# Patient Record
Sex: FEMALE | Race: WHITE | NOT HISPANIC OR LATINO | Employment: UNEMPLOYED | URBAN - METROPOLITAN AREA
[De-identification: names, ages, dates, MRNs, and addresses within clinical notes are randomized per-mention and may not be internally consistent; named-entity substitution may affect disease eponyms.]

---

## 2019-11-14 ENCOUNTER — HOSPITAL ENCOUNTER (OUTPATIENT)
Facility: HOSPITAL | Age: 39
Discharge: HOME | End: 2019-11-15
Attending: EMERGENCY MEDICINE | Admitting: INTERNAL MEDICINE
Payer: MEDICAID

## 2019-11-14 DIAGNOSIS — R41.82 ALTERED MENTAL STATE: Primary | ICD-10-CM

## 2019-11-14 DIAGNOSIS — R41.82 ALTERED MENTAL STATUS: ICD-10-CM

## 2019-11-14 PROBLEM — Z72.0 TOBACCO ABUSE: Status: ACTIVE | Noted: 2019-11-14

## 2019-11-14 LAB
ALBUMIN SERPL BCP-MCNC: 4.1 G/DL (ref 3.5–5.2)
ALP SERPL-CCNC: 92 U/L (ref 55–135)
ALT SERPL W/O P-5'-P-CCNC: 115 U/L (ref 10–44)
AMMONIA PLAS-SCNC: 32 UMOL/L (ref 10–50)
AMPHET+METHAMPHET UR QL: NEGATIVE
ANION GAP SERPL CALC-SCNC: 6 MMOL/L (ref 8–16)
APAP SERPL-MCNC: <3 UG/ML (ref 10–20)
APTT BLDCRRT: 25.7 SEC (ref 21–32)
AST SERPL-CCNC: 41 U/L (ref 10–40)
B-HCG UR QL: NEGATIVE
BARBITURATES UR QL SCN>200 NG/ML: NEGATIVE
BASOPHILS # BLD AUTO: 0.06 K/UL (ref 0–0.2)
BASOPHILS NFR BLD: 0.6 % (ref 0–1.9)
BENZODIAZ UR QL SCN>200 NG/ML: NORMAL
BILIRUB SERPL-MCNC: 0.3 MG/DL (ref 0.1–1)
BILIRUB UR QL STRIP: NEGATIVE
BNP SERPL-MCNC: 85 PG/ML (ref 0–99)
BUN SERPL-MCNC: 8 MG/DL (ref 6–20)
BZE UR QL SCN: NEGATIVE
CALCIUM SERPL-MCNC: 10.1 MG/DL (ref 8.7–10.5)
CANNABINOIDS UR QL SCN: NEGATIVE
CHLORIDE SERPL-SCNC: 106 MMOL/L (ref 95–110)
CLARITY UR: CLEAR
CO2 SERPL-SCNC: 30 MMOL/L (ref 23–29)
COLOR UR: YELLOW
CREAT SERPL-MCNC: 0.8 MG/DL (ref 0.5–1.4)
CREAT UR-MCNC: 110.7 MG/DL (ref 15–325)
CTP QC/QA: YES
DIFFERENTIAL METHOD: ABNORMAL
EOSINOPHIL # BLD AUTO: 0.1 K/UL (ref 0–0.5)
EOSINOPHIL NFR BLD: 1.1 % (ref 0–8)
ERYTHROCYTE [DISTWIDTH] IN BLOOD BY AUTOMATED COUNT: 13.4 % (ref 11.5–14.5)
EST. GFR  (AFRICAN AMERICAN): >60 ML/MIN/1.73 M^2
EST. GFR  (NON AFRICAN AMERICAN): >60 ML/MIN/1.73 M^2
ETHANOL SERPL-MCNC: <10 MG/DL
GLUCOSE SERPL-MCNC: 121 MG/DL (ref 70–110)
GLUCOSE UR QL STRIP: NEGATIVE
HCT VFR BLD AUTO: 45.5 % (ref 37–48.5)
HGB BLD-MCNC: 14.6 G/DL (ref 12–16)
HGB UR QL STRIP: NEGATIVE
IMM GRANULOCYTES # BLD AUTO: 0.06 K/UL (ref 0–0.04)
IMM GRANULOCYTES NFR BLD AUTO: 0.6 % (ref 0–0.5)
INR PPP: 1 (ref 0.8–1.2)
KETONES UR QL STRIP: NEGATIVE
LACTATE SERPL-SCNC: 1 MMOL/L (ref 0.5–2.2)
LACTATE SERPL-SCNC: 1.6 MMOL/L (ref 0.5–2.2)
LEUKOCYTE ESTERASE UR QL STRIP: ABNORMAL
LYMPHOCYTES # BLD AUTO: 2.5 K/UL (ref 1–4.8)
LYMPHOCYTES NFR BLD: 24.5 % (ref 18–48)
MAGNESIUM SERPL-MCNC: 2.2 MG/DL (ref 1.6–2.6)
MCH RBC QN AUTO: 29.3 PG (ref 27–31)
MCHC RBC AUTO-ENTMCNC: 32.1 G/DL (ref 32–36)
MCV RBC AUTO: 91 FL (ref 82–98)
METHADONE UR QL SCN>300 NG/ML: NEGATIVE
MICROSCOPIC COMMENT: NORMAL
MONOCYTES # BLD AUTO: 0.6 K/UL (ref 0.3–1)
MONOCYTES NFR BLD: 5.9 % (ref 4–15)
NEUTROPHILS # BLD AUTO: 6.9 K/UL (ref 1.8–7.7)
NEUTROPHILS NFR BLD: 67.3 % (ref 38–73)
NITRITE UR QL STRIP: NEGATIVE
NRBC BLD-RTO: 0 /100 WBC
OPIATES UR QL SCN: NEGATIVE
PCP UR QL SCN>25 NG/ML: NEGATIVE
PH UR STRIP: 7 [PH] (ref 5–8)
PLATELET # BLD AUTO: 282 K/UL (ref 150–350)
PMV BLD AUTO: 10.2 FL (ref 9.2–12.9)
POCT GLUCOSE: 114 MG/DL (ref 70–110)
POTASSIUM SERPL-SCNC: 3.9 MMOL/L (ref 3.5–5.1)
PROT SERPL-MCNC: 7.3 G/DL (ref 6–8.4)
PROT UR QL STRIP: NEGATIVE
PROTHROMBIN TIME: 10 SEC (ref 9–12.5)
RBC # BLD AUTO: 4.99 M/UL (ref 4–5.4)
SALICYLATES SERPL-MCNC: <5 MG/DL (ref 15–30)
SODIUM SERPL-SCNC: 142 MMOL/L (ref 136–145)
SP GR UR STRIP: 1.02 (ref 1–1.03)
T4 FREE SERPL-MCNC: 0.69 NG/DL (ref 0.71–1.51)
TOXICOLOGY INFORMATION: NORMAL
TROPONIN I SERPL DL<=0.01 NG/ML-MCNC: 0.01 NG/ML (ref 0–0.03)
TSH SERPL DL<=0.005 MIU/L-ACNC: 0.38 UIU/ML (ref 0.4–4)
URN SPEC COLLECT METH UR: ABNORMAL
UROBILINOGEN UR STRIP-ACNC: NEGATIVE EU/DL
WBC # BLD AUTO: 10.3 K/UL (ref 3.9–12.7)
WBC #/AREA URNS HPF: 1 /HPF (ref 0–5)

## 2019-11-14 PROCEDURE — 83735 ASSAY OF MAGNESIUM: CPT

## 2019-11-14 PROCEDURE — 96367 TX/PROPH/DG ADDL SEQ IV INF: CPT | Mod: 59

## 2019-11-14 PROCEDURE — 51702 INSERT TEMP BLADDER CATH: CPT | Mod: 59

## 2019-11-14 PROCEDURE — 80307 DRUG TEST PRSMV CHEM ANLYZR: CPT

## 2019-11-14 PROCEDURE — 85610 PROTHROMBIN TIME: CPT

## 2019-11-14 PROCEDURE — 81025 URINE PREGNANCY TEST: CPT | Performed by: EMERGENCY MEDICINE

## 2019-11-14 PROCEDURE — 99291 CRITICAL CARE FIRST HOUR: CPT | Mod: 25

## 2019-11-14 PROCEDURE — 82962 GLUCOSE BLOOD TEST: CPT

## 2019-11-14 PROCEDURE — G0378 HOSPITAL OBSERVATION PER HR: HCPCS

## 2019-11-14 PROCEDURE — 80320 DRUG SCREEN QUANTALCOHOLS: CPT

## 2019-11-14 PROCEDURE — 84484 ASSAY OF TROPONIN QUANT: CPT

## 2019-11-14 PROCEDURE — 81000 URINALYSIS NONAUTO W/SCOPE: CPT | Mod: 59

## 2019-11-14 PROCEDURE — 31500 INSERT EMERGENCY AIRWAY: CPT

## 2019-11-14 PROCEDURE — 63600175 PHARM REV CODE 636 W HCPCS: Performed by: EMERGENCY MEDICINE

## 2019-11-14 PROCEDURE — 83605 ASSAY OF LACTIC ACID: CPT | Mod: 91

## 2019-11-14 PROCEDURE — 96375 TX/PRO/DX INJ NEW DRUG ADDON: CPT | Mod: 59

## 2019-11-14 PROCEDURE — 83880 ASSAY OF NATRIURETIC PEPTIDE: CPT

## 2019-11-14 PROCEDURE — 85730 THROMBOPLASTIN TIME PARTIAL: CPT

## 2019-11-14 PROCEDURE — 84439 ASSAY OF FREE THYROXINE: CPT

## 2019-11-14 PROCEDURE — 82140 ASSAY OF AMMONIA: CPT

## 2019-11-14 PROCEDURE — 63600175 PHARM REV CODE 636 W HCPCS

## 2019-11-14 PROCEDURE — 99900035 HC TECH TIME PER 15 MIN (STAT)

## 2019-11-14 PROCEDURE — 94002 VENT MGMT INPAT INIT DAY: CPT

## 2019-11-14 PROCEDURE — 96365 THER/PROPH/DIAG IV INF INIT: CPT | Mod: 59

## 2019-11-14 PROCEDURE — 80329 ANALGESICS NON-OPIOID 1 OR 2: CPT

## 2019-11-14 PROCEDURE — 80053 COMPREHEN METABOLIC PANEL: CPT

## 2019-11-14 PROCEDURE — 84443 ASSAY THYROID STIM HORMONE: CPT

## 2019-11-14 PROCEDURE — 25000003 PHARM REV CODE 250: Performed by: PHYSICIAN ASSISTANT

## 2019-11-14 PROCEDURE — 83605 ASSAY OF LACTIC ACID: CPT

## 2019-11-14 PROCEDURE — 85025 COMPLETE CBC W/AUTO DIFF WBC: CPT

## 2019-11-14 PROCEDURE — 25000003 PHARM REV CODE 250: Performed by: EMERGENCY MEDICINE

## 2019-11-14 RX ORDER — ACETAMINOPHEN 500 MG
500 TABLET ORAL EVERY 6 HOURS PRN
Status: DISCONTINUED | OUTPATIENT
Start: 2019-11-14 | End: 2019-11-15 | Stop reason: HOSPADM

## 2019-11-14 RX ORDER — MIDAZOLAM HYDROCHLORIDE 1 MG/ML
INJECTION INTRAMUSCULAR; INTRAVENOUS
Status: COMPLETED
Start: 2019-11-14 | End: 2019-11-14

## 2019-11-14 RX ORDER — ETOMIDATE 2 MG/ML
15 INJECTION INTRAVENOUS
Status: COMPLETED | OUTPATIENT
Start: 2019-11-14 | End: 2019-11-14

## 2019-11-14 RX ORDER — PROPOFOL 10 MG/ML
20 INJECTION, EMULSION INTRAVENOUS
Status: COMPLETED | OUTPATIENT
Start: 2019-11-14 | End: 2019-11-14

## 2019-11-14 RX ORDER — FENTANYL CITRATE-0.9 % NACL/PF 10 MCG/ML
25 PLASTIC BAG, INJECTION (ML) INTRAVENOUS CONTINUOUS
Status: DISCONTINUED | OUTPATIENT
Start: 2019-11-14 | End: 2019-11-14

## 2019-11-14 RX ORDER — MIDAZOLAM HYDROCHLORIDE 1 MG/ML
5 INJECTION INTRAMUSCULAR; INTRAVENOUS
Status: DISCONTINUED | OUTPATIENT
Start: 2019-11-14 | End: 2019-11-14

## 2019-11-14 RX ORDER — MIDAZOLAM HYDROCHLORIDE 1 MG/ML
2 INJECTION INTRAMUSCULAR; INTRAVENOUS
Status: COMPLETED | OUTPATIENT
Start: 2019-11-14 | End: 2019-11-14

## 2019-11-14 RX ORDER — ONDANSETRON 2 MG/ML
4 INJECTION INTRAMUSCULAR; INTRAVENOUS EVERY 8 HOURS PRN
Status: DISCONTINUED | OUTPATIENT
Start: 2019-11-14 | End: 2019-11-15 | Stop reason: HOSPADM

## 2019-11-14 RX ORDER — BUPROPION HYDROCHLORIDE 150 MG/1
150 TABLET ORAL DAILY
Status: ON HOLD | COMMUNITY
End: 2019-11-15 | Stop reason: HOSPADM

## 2019-11-14 RX ORDER — HYDROMORPHONE HYDROCHLORIDE 2 MG/ML
1 INJECTION, SOLUTION INTRAMUSCULAR; INTRAVENOUS; SUBCUTANEOUS
Status: COMPLETED | OUTPATIENT
Start: 2019-11-14 | End: 2019-11-14

## 2019-11-14 RX ORDER — ETOMIDATE 2 MG/ML
INJECTION INTRAVENOUS
Status: DISPENSED
Start: 2019-11-14 | End: 2019-11-15

## 2019-11-14 RX ORDER — ROCURONIUM BROMIDE 10 MG/ML
INJECTION, SOLUTION INTRAVENOUS
Status: DISPENSED
Start: 2019-11-14 | End: 2019-11-15

## 2019-11-14 RX ORDER — MIDAZOLAM HYDROCHLORIDE 1 MG/ML
INJECTION INTRAMUSCULAR; INTRAVENOUS
Status: DISCONTINUED
Start: 2019-11-14 | End: 2019-11-14 | Stop reason: WASHOUT

## 2019-11-14 RX ORDER — SODIUM CHLORIDE 0.9 % (FLUSH) 0.9 %
10 SYRINGE (ML) INJECTION
Status: DISCONTINUED | OUTPATIENT
Start: 2019-11-14 | End: 2019-11-15 | Stop reason: HOSPADM

## 2019-11-14 RX ORDER — TRAZODONE HYDROCHLORIDE 100 MG/1
100 TABLET ORAL NIGHTLY
Status: ON HOLD | COMMUNITY
End: 2019-11-15 | Stop reason: HOSPADM

## 2019-11-14 RX ORDER — ROCURONIUM BROMIDE 10 MG/ML
50 INJECTION, SOLUTION INTRAVENOUS
Status: COMPLETED | OUTPATIENT
Start: 2019-11-14 | End: 2019-11-14

## 2019-11-14 RX ORDER — PROPOFOL 10 MG/ML
20 INJECTION, EMULSION INTRAVENOUS CONTINUOUS
Status: DISCONTINUED | OUTPATIENT
Start: 2019-11-14 | End: 2019-11-14

## 2019-11-14 RX ORDER — MULTIVITAMIN
1 TABLET ORAL DAILY
COMMUNITY

## 2019-11-14 RX ORDER — ROCURONIUM BROMIDE 10 MG/ML
INJECTION, SOLUTION INTRAVENOUS
Status: DISCONTINUED
Start: 2019-11-14 | End: 2019-11-14 | Stop reason: WASHOUT

## 2019-11-14 RX ADMIN — ETOMIDATE 15 MG: 2 INJECTION, SOLUTION INTRAVENOUS at 05:11

## 2019-11-14 RX ADMIN — MIDAZOLAM HYDROCHLORIDE 2 MG: 1 INJECTION, SOLUTION INTRAMUSCULAR; INTRAVENOUS at 06:11

## 2019-11-14 RX ADMIN — Medication 25 MCG/HR: at 06:11

## 2019-11-14 RX ADMIN — ACETAMINOPHEN 500 MG: 500 TABLET, FILM COATED ORAL at 10:11

## 2019-11-14 RX ADMIN — MIDAZOLAM HYDROCHLORIDE 2 MG: 1 INJECTION INTRAMUSCULAR; INTRAVENOUS at 06:11

## 2019-11-14 RX ADMIN — PROPOFOL 5 MCG/KG/MIN: 10 INJECTION, EMULSION INTRAVENOUS at 05:11

## 2019-11-14 RX ADMIN — ROCURONIUM BROMIDE 50 MG: 10 INJECTION, SOLUTION INTRAVENOUS at 05:11

## 2019-11-14 RX ADMIN — PROPOFOL 20 MCG/KG/MIN: 10 INJECTION, EMULSION INTRAVENOUS at 05:11

## 2019-11-14 RX ADMIN — HYDROMORPHONE HYDROCHLORIDE 1 MG: 2 INJECTION, SOLUTION INTRAMUSCULAR; INTRAVENOUS; SUBCUTANEOUS at 05:11

## 2019-11-14 NOTE — ED NOTES
Pt carried in by son unresponsive. Son state that they were in [employer], pt state they may have to call EMS, pt then started shaking and collapsed. Pt recently was discharged from a rehab facility. Upon arrival pt not maintaining airway. Narcan was unsuccessful with no response.

## 2019-11-14 NOTE — PROGRESS NOTES
Pt intubated and placed on Servo I vent with settings as followed: PRVC 12/400/+5 and 30%.  Size 7.5 ETT in place and secure at 25 cm at the lip.  Ambu bag and mask at bedside and all alarms on and functioning. NARN. RT will continue to monitor pt status.

## 2019-11-14 NOTE — ED PROVIDER NOTES
"Encounter Date: 2019    SCRIBE #1 NOTE: I, Rohit Flores, am scribing for, and in the presence of,  Antwon Pennington MD. I have scribed the following portions of the note - Other sections scribed: HPI, ROS.       History     Chief Complaint   Patient presents with    Altered Mental Status     Arrives to ER with personal transport through ambulance entrance being carried by son. Son reports pt. was in rehab has a hx of meth addiction. Pt. appears to be altered and confused, is attempting to follow commands.      44 y.o F with a hx of Methamphetamine abuse presents to the ED via personal vehicle for emergent evaluation of AMS. The pt was carried into the ED by her son. The pt's son states "she was complaining of a headache and then she started to shake." The pt repeatedly states "my head hurts." The pt's son notes that she recently returned home from rehab for methamphetamine abuse. The pt denies any drug recent drug use.     The history is provided by a relative and the patient.     Review of patient's allergies indicates:  No Known Allergies  Past Medical History:   Diagnosis Date    Arthritis     rheumatoid arthritis    Asthma     Hypertension     "since i been trying to get ofrf suboxone"    Seizures      Past Surgical History:   Procedure Laterality Date    ABDOMINAL SURGERY  1999    3 c- sections     SECTION N/A      Family History   Problem Relation Age of Onset    Diabetes Mother     Heart disease Mother      Social History     Tobacco Use    Smoking status: Current Every Day Smoker     Packs/day: 0.25     Years: 15.00     Pack years: 3.75     Types: Cigarettes    Smokeless tobacco: Current User   Substance Use Topics    Alcohol use: Never     Frequency: Never    Drug use: Not Currently     Review of Systems   Unable to perform ROS: Mental status change   Neurological: Positive for headaches.       Physical Exam     Initial Vitals [19 1709]   BP Pulse Resp Temp SpO2   133/75 " 91 16 98.3 °F (36.8 °C) 100 %      MAP       --         Physical Exam    Nursing note and vitals reviewed.  Constitutional:   Thin, disheveled    Would answer a couple of questions at first then became unresponsive   HENT:   Head: Normocephalic and atraumatic.   Right Ear: External ear normal.   Left Ear: External ear normal.   No benson sign or racoon eyes   Eyes: EOM are normal. Pupils are equal, round, and reactive to light.   Neck: Normal range of motion.   No c spine step off   Cardiovascular: Normal rate and regular rhythm.   Pulmonary/Chest: Breath sounds normal. No stridor. No respiratory distress.   Abdominal: Soft. Bowel sounds are normal. She exhibits no distension.   Genitourinary:   Genitourinary Comments: No obvious bleeding   Musculoskeletal: Normal range of motion. She exhibits no edema.   Neurological:   Originally patient would answer a couple basic questions and was spontaneously moving all her extremities.  Then patient started looking up and back and to the right.  Patient then started becoming more less responsive and didn't respond to sternum rub.   Skin: Skin is warm and dry. Capillary refill takes less than 2 seconds.   Psychiatric:   Unable to assess due to mental status         ED Course   Intubation  Date/Time: 11/14/2019 5:32 PM  Performed by: Antwon Pennington MD  Authorized by: Antwon Pennington MD   Consent Done: Emergent Situation  Indications: respiratory distress,  respiratory failure and  airway protection  Intubation method: direct  Patient status: paralyzed (RSI) (And sedated)  Preoxygenation: nonrebreather mask  Sedatives: etomidate (15)  Paralytic: rocuronium (50)  Laryngoscope size: Mac 4  Tube size: 7.5 mm  Tube type: cuffed  Number of attempts: 1  Cricoid pressure: yes  Cords visualized: yes  Post-procedure assessment: chest rise and ETCO2 monitor  Breath sounds: equal and clear  Cuff inflated: yes  ETT to lip: 25 cm  Tube secured with: ETT zamora  Chest x-ray interpreted by me  and radiologist.  Chest x-ray findings: endotracheal tube in appropriate position  Patient tolerance: Patient tolerated the procedure well with no immediate complications  Complications: No        Labs Reviewed   CBC W/ AUTO DIFFERENTIAL - Abnormal; Notable for the following components:       Result Value    Immature Granulocytes 0.6 (*)     Immature Grans (Abs) 0.06 (*)     All other components within normal limits   COMPREHENSIVE METABOLIC PANEL - Abnormal; Notable for the following components:    CO2 30 (*)     Glucose 121 (*)     AST 41 (*)      (*)     Anion Gap 6 (*)     All other components within normal limits   TSH - Abnormal; Notable for the following components:    TSH 0.375 (*)     All other components within normal limits   URINALYSIS, REFLEX TO URINE CULTURE - Abnormal; Notable for the following components:    Leukocytes, UA Trace (*)     All other components within normal limits    Narrative:     Preferred Collection Type->Urine, Clean Catch   SALICYLATE LEVEL - Abnormal; Notable for the following components:    Salicylate Lvl <5.0 (*)     All other components within normal limits   ACETAMINOPHEN LEVEL - Abnormal; Notable for the following components:    Acetaminophen (Tylenol), Serum <3.0 (*)     All other components within normal limits   T4, FREE - Abnormal; Notable for the following components:    Free T4 0.69 (*)     All other components within normal limits   POCT GLUCOSE - Abnormal; Notable for the following components:    POCT Glucose 114 (*)     All other components within normal limits   APTT   B-TYPE NATRIURETIC PEPTIDE   DRUG SCREEN PANEL, URINE EMERGENCY    Narrative:     Preferred Collection Type->Urine, Clean Catch   LACTIC ACID, PLASMA   MAGNESIUM   PROTIME-INR   TROPONIN I   AMMONIA   ALCOHOL,MEDICAL (ETHANOL)   LACTIC ACID, PLASMA   URINALYSIS MICROSCOPIC    Narrative:     Preferred Collection Type->Urine, Clean Catch   POCT URINE PREGNANCY          Imaging Results           X-Ray Chest AP Portable (Final result)  Result time 11/14/19 19:40:19    Final result by Raymond Espinoza MD (11/14/19 19:40:19)                 Impression:      No acute process.      Electronically signed by: Raymond Espinoza MD  Date:    11/14/2019  Time:    19:40             Narrative:    EXAMINATION:  XR CHEST AP PORTABLE    CLINICAL HISTORY:  Altered mental status, unspecified    TECHNIQUE:  Single frontal view of the chest was performed.    COMPARISON:  11/14/2019.    FINDINGS:  The support devices are no longer present.  The trachea is unremarkable.  The cardiomediastinal silhouette is within normal limits.  The hemidiaphragms are unremarkable.  There is no evidence of free air beneath the hemidiaphragms.  There are no pleural effusions.  There is no evidence of a pneumothorax.  There is no evidence of pneumomediastinum.  No airspace opacity is present.  The osseous structures are unremarkable.                               CT Head Without Contrast (Final result)  Result time 11/14/19 17:46:11    Final result by Clover Chandler MD (11/14/19 17:46:11)                 Impression:      No acute intracranial abnormality detected.    Mild bilateral ethmoid and left sphenoid sinus disease.      Electronically signed by: Clover Chandler  Date:    11/14/2019  Time:    17:46             Narrative:    EXAMINATION:  CT OF THE HEAD WITHOUT    CLINICAL HISTORY:  Confusion/delirium, altered LOC, unexplained;    TECHNIQUE:  5 mm unenhanced axial images were obtained from the skull base to the vertex.    COMPARISON:  None.    FINDINGS:  The ventricles, basal cisterns, and cortical sulci are within normal limits for patient's stated age. There is no acute intracranial hemorrhage, territorial infarct or mass effect, or midline shift. In the visualized paranasal sinuses, there is mucoperiosteal thickening involving bilateral posterior ethmoid and the left sphenoid sinus.                               X-Ray Chest AP Portable (Final  result)  Result time 11/14/19 17:44:30    Final result by Raymond Espinoza MD (11/14/19 17:44:30)                 Impression:      Satisfactory position of endotracheal and enteric tube tips.    No acute intrathoracic process.      Electronically signed by: Raymond Espinoza MD  Date:    11/14/2019  Time:    17:44             Narrative:    EXAMINATION:  XR CHEST AP PORTABLE    CLINICAL HISTORY:  intubation;    TECHNIQUE:  Single frontal view of the chest was performed.    COMPARISON:  None    FINDINGS:  The endotracheal tube tip is 4 cm from the kaye.  The enteric tube tip is in the left lower abdominal quadrant.  Monitoring EKG leads are present.    The cardiomediastinal silhouette is within normal limits.  The hilar structures are unremarkable.  The hemidiaphragms are within normal limits.  There is no evidence of free air beneath the hemidiaphragms.  There are no pleural effusions.  There is no evidence of a pneumothorax.  There is no evidence of pneumomediastinum.  No airspace opacity is present.  The osseous structures are unremarkable.                                 Medical Decision Making:   History:   Old Medical Records: I decided to obtain old medical records.  Initial Assessment:   Patient arrived via son in his arm with AMS. physical exam remarkable for decreasing mental status and looking up and back into the right and GCS fell to 7.  My highest concern is that patient has intracranial bleed.    Given patient has normal O2, hypoxia less likely.  BgL is in the 100, making hypoglycemia, DKA, HHS less likely    DDX still includes, but is not limited to: EtOH, electrolyte abnormality (low/high Na, low/high Mg, hypocalcemia, hypophosphatemia), abnormal thyroid function, infection/sepsis, drug overdose, hypothermia, uremia, trauma, encephalopathy/encephalitis, CVA, SAH or other ICH, postictal state 2/2 seizure, psych.    Labs  Urine/pregnancy  ECG  CXR  Head CT        Please put in 35 minutes of critical care due  to patient having a high risk of respiratory/neuro failure.   Separate from teaching and exclusive of procedure and ekg time  Includes:  Time at bedside  Time reviewing test results  Time discussing case with staff  Time documenting the medical record  Time spent with family members  Time spent with consults  Management      Clinical Tests:   Lab Tests: Reviewed and Ordered  Radiological Study: Ordered and Reviewed  Medical Tests: Ordered and Reviewed  ED Management:  Please put in 35 minutes of critical care due to patient having a high risk of respiratory failure.   Separate from teaching and exclusive of procedure and ekg time  Includes:  Time at bedside  Time reviewing test results  Time discussing case with staff  Time documenting the medical record  Time spent with family members  Time spent with consults  Management      39-year-old female presenting after suspected overdose, intubated for respiratory failure with patient apneic and respiratory protection with Narcan not producing any significant response.  Patient subsequently undergoing extensive workup with grossly unremarkable lab work with patient increasingly more agitated on vent.  Ultimately patient maxed out on fentanyl, propofol, Versed drips including boluses without any significant improvement in agitation.  Patient reassessed and noted to be easily redirectable communicating with her hands and following commands at this time.  Sedation completely shut off for 5-10 minutes, with patient remaining calm breathing over the vent, and following directions.  Given circumstances is in likely overdose recently, with improvement almost 2 hr after intubation, patient extubated to nasal cannula O2 with end-tidal monitoring in place, post extubation x-ray unremarkable. Patient placed on observation in the ICU overnight for further observation.  Patient transferred to the ICU in stable and improved condition.            Scribe Attestation:   Scribe #1: I  performed the above scribed service and the documentation accurately describes the services I performed. I attest to the accuracy of the note.    I, Malvin Collado M.D., personally performed the services described in this documentation. All medical record entries made by the scribe were at my direction and in my presence. I have reviewed the chart and agree that the record reflects my personal performance and is accurate and complete.                       Clinical Impression:       ICD-10-CM ICD-9-CM   1. Altered mental state R41.82 780.97   2. Altered mental status R41.82 780.97                             Malvin Collado MD  11/18/19 0802

## 2019-11-15 VITALS
SYSTOLIC BLOOD PRESSURE: 116 MMHG | RESPIRATION RATE: 26 BRPM | HEIGHT: 64 IN | BODY MASS INDEX: 21.56 KG/M2 | DIASTOLIC BLOOD PRESSURE: 74 MMHG | TEMPERATURE: 99 F | WEIGHT: 126.31 LBS | OXYGEN SATURATION: 99 % | HEART RATE: 85 BPM

## 2019-11-15 LAB
ALBUMIN SERPL BCP-MCNC: 3.4 G/DL (ref 3.5–5.2)
ALP SERPL-CCNC: 82 U/L (ref 55–135)
ALT SERPL W/O P-5'-P-CCNC: 84 U/L (ref 10–44)
ANION GAP SERPL CALC-SCNC: 6 MMOL/L (ref 8–16)
AST SERPL-CCNC: 31 U/L (ref 10–40)
BASOPHILS # BLD AUTO: 0.05 K/UL (ref 0–0.2)
BASOPHILS NFR BLD: 0.5 % (ref 0–1.9)
BILIRUB SERPL-MCNC: 0.2 MG/DL (ref 0.1–1)
BUN SERPL-MCNC: 10 MG/DL (ref 6–20)
CALCIUM SERPL-MCNC: 9.2 MG/DL (ref 8.7–10.5)
CHLORIDE SERPL-SCNC: 108 MMOL/L (ref 95–110)
CO2 SERPL-SCNC: 29 MMOL/L (ref 23–29)
CREAT SERPL-MCNC: 0.7 MG/DL (ref 0.5–1.4)
DIFFERENTIAL METHOD: NORMAL
EOSINOPHIL # BLD AUTO: 0.2 K/UL (ref 0–0.5)
EOSINOPHIL NFR BLD: 1.8 % (ref 0–8)
ERYTHROCYTE [DISTWIDTH] IN BLOOD BY AUTOMATED COUNT: 13.5 % (ref 11.5–14.5)
EST. GFR  (AFRICAN AMERICAN): >60 ML/MIN/1.73 M^2
EST. GFR  (NON AFRICAN AMERICAN): >60 ML/MIN/1.73 M^2
GLUCOSE SERPL-MCNC: 100 MG/DL (ref 70–110)
HCT VFR BLD AUTO: 43.2 % (ref 37–48.5)
HGB BLD-MCNC: 14 G/DL (ref 12–16)
IMM GRANULOCYTES # BLD AUTO: 0.03 K/UL (ref 0–0.04)
IMM GRANULOCYTES NFR BLD AUTO: 0.3 % (ref 0–0.5)
LYMPHOCYTES # BLD AUTO: 2.3 K/UL (ref 1–4.8)
LYMPHOCYTES NFR BLD: 25 % (ref 18–48)
MCH RBC QN AUTO: 29 PG (ref 27–31)
MCHC RBC AUTO-ENTMCNC: 32.4 G/DL (ref 32–36)
MCV RBC AUTO: 90 FL (ref 82–98)
MONOCYTES # BLD AUTO: 0.7 K/UL (ref 0.3–1)
MONOCYTES NFR BLD: 7.2 % (ref 4–15)
NEUTROPHILS # BLD AUTO: 6 K/UL (ref 1.8–7.7)
NEUTROPHILS NFR BLD: 65.2 % (ref 38–73)
NRBC BLD-RTO: 0 /100 WBC
PLATELET # BLD AUTO: 227 K/UL (ref 150–350)
PMV BLD AUTO: 9.8 FL (ref 9.2–12.9)
POTASSIUM SERPL-SCNC: 3.7 MMOL/L (ref 3.5–5.1)
PROT SERPL-MCNC: 6.1 G/DL (ref 6–8.4)
RBC # BLD AUTO: 4.82 M/UL (ref 4–5.4)
SODIUM SERPL-SCNC: 143 MMOL/L (ref 136–145)
WBC # BLD AUTO: 9.25 K/UL (ref 3.9–12.7)

## 2019-11-15 PROCEDURE — 25000003 PHARM REV CODE 250: Performed by: PHYSICIAN ASSISTANT

## 2019-11-15 PROCEDURE — 36415 COLL VENOUS BLD VENIPUNCTURE: CPT

## 2019-11-15 PROCEDURE — 25000003 PHARM REV CODE 250: Performed by: INTERNAL MEDICINE

## 2019-11-15 PROCEDURE — 63600175 PHARM REV CODE 636 W HCPCS: Performed by: INTERNAL MEDICINE

## 2019-11-15 PROCEDURE — G0378 HOSPITAL OBSERVATION PER HR: HCPCS

## 2019-11-15 PROCEDURE — 96375 TX/PRO/DX INJ NEW DRUG ADDON: CPT

## 2019-11-15 PROCEDURE — S4991 NICOTINE PATCH NONLEGEND: HCPCS | Performed by: INTERNAL MEDICINE

## 2019-11-15 PROCEDURE — 80053 COMPREHEN METABOLIC PANEL: CPT

## 2019-11-15 PROCEDURE — 85025 COMPLETE CBC W/AUTO DIFF WBC: CPT

## 2019-11-15 RX ORDER — KETOROLAC TROMETHAMINE 30 MG/ML
30 INJECTION, SOLUTION INTRAMUSCULAR; INTRAVENOUS ONCE
Status: COMPLETED | OUTPATIENT
Start: 2019-11-15 | End: 2019-11-15

## 2019-11-15 RX ORDER — BUTALBITAL, ACETAMINOPHEN AND CAFFEINE 50; 325; 40 MG/1; MG/1; MG/1
1 TABLET ORAL ONCE
Status: COMPLETED | OUTPATIENT
Start: 2019-11-15 | End: 2019-11-15

## 2019-11-15 RX ORDER — IBUPROFEN 200 MG
1 TABLET ORAL ONCE
Status: DISCONTINUED | OUTPATIENT
Start: 2019-11-15 | End: 2019-11-15 | Stop reason: HOSPADM

## 2019-11-15 RX ADMIN — NICOTINE 1 PATCH: 21 PATCH, EXTENDED RELEASE TRANSDERMAL at 10:11

## 2019-11-15 RX ADMIN — ACETAMINOPHEN 500 MG: 500 TABLET, FILM COATED ORAL at 07:11

## 2019-11-15 RX ADMIN — BUTALBITAL, ACETAMINOPHEN AND CAFFEINE 1 TABLET: 50; 325; 40 TABLET ORAL at 03:11

## 2019-11-15 RX ADMIN — KETOROLAC TROMETHAMINE 30 MG: 30 INJECTION, SOLUTION INTRAMUSCULAR; INTRAVENOUS at 09:11

## 2019-11-15 NOTE — H&P
[facility] Hospital Medicine  History & Physical    Patient Name: Radha Alicea  MRN: [id number]  Admission Date: 11/14/2019  Attending Physician: Ekaterina Lao MD  Primary Care Provider: Primary Doctor No         Patient information was obtained from patient, past medical records and ER records.     Subjective:     Principal Problem:Altered mental state    Chief Complaint:   Chief Complaint   Patient presents with    Altered Mental Status     Arrives to ER with personal transport through ambulance entrance being carried by son. Son reports pt. was in rehab has a hx of meth addiction. Pt. appears to be altered and confused, is attempting to follow commands.         HPI: Radha Alicea 39 y.o. female with previous Suboxone use presents to the hospital with a chief complaint of altered mental status. The patient is unsure of the exact events that proceeded presentation to the hospital. The son reports today while at [facility] the patient complained of a headache and instructed son to call EMS. The son took the patient to the car and attempted to drive her to the hospital. On the way to the hospital the patient vomited and then went unresponsive, and had to be carried into the ED. The patient reports she entered rehab on 11/3 for Suboxone. She has not had any Suboxone since that time. Since then she has had N/V/D, headaches, and sweating that she attributed to withdrawl. She was released from [facility] in [place], a rehab facility, 4 days ago. She now lives with her children. She denies any recreational drugs since release. She reports she was given prescriptions for trazodone and wellbutrin from the Rehab with which she has been compliant. The family is confident she has not used any recreational drugs since discharge. She believes the rehab unit may have been giving her benzos to help with withdrawal, but she is unsure. She smokes 0.25ppd, and denies alcohol use. She is no  longer using recreational drugs. She does have a remote history of seizures, but the patient and family are unable to describe the type.    History reviewed. No pertinent past medical history.    Past Surgical History:   Procedure Laterality Date     SECTION N/A        Review of patient's allergies indicates:  No Known Allergies    No current facility-administered medications on file prior to encounter.      No current outpatient medications on file prior to encounter.     Family History     Problem Relation (Age of Onset)    Diabetes Mother    Heart disease Mother        Tobacco Use    Smoking status: Current Every Day Smoker     Packs/day: 0.25   Substance and Sexual Activity    Alcohol use: Never     Frequency: Never    Drug use: Not Currently    Sexual activity: Not on file     Review of Systems   Constitutional: Negative for chills and fever.   HENT: Positive for congestion. Negative for nosebleeds and tinnitus.    Eyes: Negative for photophobia and visual disturbance.   Respiratory: Negative for shortness of breath and wheezing.    Cardiovascular: Negative for chest pain, palpitations and leg swelling.   Gastrointestinal: Positive for abdominal pain, diarrhea, nausea and vomiting. Negative for abdominal distention.   Genitourinary: Negative for dysuria, flank pain and hematuria.   Musculoskeletal: Negative for gait problem and joint swelling.   Skin: Negative for rash and wound.   Neurological: Negative for seizures and syncope.     Objective:     Vital Signs (Most Recent):  Temp: 98.3 °F (36.8 °C) (19 1709)  Pulse: 90 (19)  Resp: 16 (19)  BP: 101/62 (19)  SpO2: 100 % (19) Vital Signs (24h Range):  Temp:  [98.3 °F (36.8 °C)] 98.3 °F (36.8 °C)  Pulse:  [] 90  Resp:  [12-58] 16  SpO2:  [99 %-100 %] 100 %  BP: ()/() 101/62     Weight: 59 kg (130 lb)  Body mass index is 22.31 kg/m².    Physical Exam   Constitutional: She appears  well-developed and well-nourished. No distress.   HENT:   Head: Normocephalic and atraumatic.   Eyes: Conjunctivae and EOM are normal. Right eye exhibits no discharge. Left eye exhibits no discharge.   Neck: Normal range of motion. No thyromegaly present.   Cardiovascular: Normal rate and regular rhythm.   No murmur heard.  Pulmonary/Chest: Effort normal and breath sounds normal. No respiratory distress.   Abdominal: Soft. Bowel sounds are normal. She exhibits no distension and no mass. There is no tenderness.   Musculoskeletal: She exhibits no edema or deformity.   Neurological: She is alert. No sensory deficit.   Tremulous on finger to nose. Oriented to person and place. Disoriented to time   Skin: Skin is warm and dry.   Psychiatric: She has a normal mood and affect. Her behavior is normal.   Nursing note and vitals reviewed.        CRANIAL NERVES     CN III, IV, VI   Extraocular motions are normal.        Significant Labs:   CBC:   Recent Labs   Lab 11/14/19 1727   WBC 10.30   HGB 14.6   HCT 45.5        CMP:   Recent Labs   Lab 11/14/19 1727      K 3.9      CO2 30*   *   BUN 8   CREATININE 0.8   CALCIUM 10.1   PROT 7.3   ALBUMIN 4.1   BILITOT 0.3   ALKPHOS 92   AST 41*   *   ANIONGAP 6*   EGFRNONAA >60     Cardiac Markers:   Recent Labs   Lab 11/14/19 1727   BNP 85     Coagulation:   Recent Labs   Lab 11/14/19 1727   INR 1.0   APTT 25.7     Lactic Acid:   Recent Labs   Lab 11/14/19 1727 11/14/19  2018   LACTATE 1.6 1.0     Troponin:   Recent Labs   Lab 11/14/19 1727   TROPONINI 0.012     TSH:   Recent Labs   Lab 11/14/19 1727   TSH 0.375*     Urine Studies:   Recent Labs   Lab 11/14/19  1734   COLORU Yellow   APPEARANCEUA Clear   PHUR 7.0   SPECGRAV 1.020   PROTEINUA Negative   GLUCUA Negative   KETONESU Negative   BILIRUBINUA Negative   OCCULTUA Negative   NITRITE Negative   UROBILINOGEN Negative   LEUKOCYTESUR Trace*   WBCUA 1       Significant Imaging:   Imaging Results           X-Ray Chest AP Portable (Final result)  Result time 11/14/19 19:40:19    Final result by Raymond Espinoza MD (11/14/19 19:40:19)                 Impression:      No acute process.      Electronically signed by: Raymond Espinoza MD  Date:    11/14/2019  Time:    19:40             Narrative:    EXAMINATION:  XR CHEST AP PORTABLE    CLINICAL HISTORY:  Altered mental status, unspecified    TECHNIQUE:  Single frontal view of the chest was performed.    COMPARISON:  11/14/2019.    FINDINGS:  The support devices are no longer present.  The trachea is unremarkable.  The cardiomediastinal silhouette is within normal limits.  The hemidiaphragms are unremarkable.  There is no evidence of free air beneath the hemidiaphragms.  There are no pleural effusions.  There is no evidence of a pneumothorax.  There is no evidence of pneumomediastinum.  No airspace opacity is present.  The osseous structures are unremarkable.                               CT Head Without Contrast (Final result)  Result time 11/14/19 17:46:11    Final result by Clover Chandler MD (11/14/19 17:46:11)                 Impression:      No acute intracranial abnormality detected.    Mild bilateral ethmoid and left sphenoid sinus disease.      Electronically signed by: Clover Chandler  Date:    11/14/2019  Time:    17:46             Narrative:    EXAMINATION:  CT OF THE HEAD WITHOUT    CLINICAL HISTORY:  Confusion/delirium, altered LOC, unexplained;    TECHNIQUE:  5 mm unenhanced axial images were obtained from the skull base to the vertex.    COMPARISON:  None.    FINDINGS:  The ventricles, basal cisterns, and cortical sulci are within normal limits for patient's stated age. There is no acute intracranial hemorrhage, territorial infarct or mass effect, or midline shift. In the visualized paranasal sinuses, there is mucoperiosteal thickening involving bilateral posterior ethmoid and the left sphenoid sinus.                               X-Ray Chest AP  Portable (Final result)  Result time 11/14/19 17:44:30    Final result by Raymond Espinoza MD (11/14/19 17:44:30)                 Impression:      Satisfactory position of endotracheal and enteric tube tips.    No acute intrathoracic process.      Electronically signed by: Raymond Espinoza MD  Date:    11/14/2019  Time:    17:44             Narrative:    EXAMINATION:  XR CHEST AP PORTABLE    CLINICAL HISTORY:  intubation;    TECHNIQUE:  Single frontal view of the chest was performed.    COMPARISON:  None    FINDINGS:  The endotracheal tube tip is 4 cm from the kaye.  The enteric tube tip is in the left lower abdominal quadrant.  Monitoring EKG leads are present.    The cardiomediastinal silhouette is within normal limits.  The hilar structures are unremarkable.  The hemidiaphragms are within normal limits.  There is no evidence of free air beneath the hemidiaphragms.  There are no pleural effusions.  There is no evidence of a pneumothorax.  There is no evidence of pneumomediastinum.  No airspace opacity is present.  The osseous structures are unremarkable.                                  Assessment/Plan:     * Altered mental state  Unclear as to the etiology of event today. Her electrolytes are within normal limits, CT head without acute abnormality, chest x-ray without acute process, UDS positive for benzos (though family denies, and attributes to medication given by rehab), TSH of 0.375 and T4 of 0.69, no fevers or leukocytosis, and no signs of uremia. She required intubation in the ED for airway protection, but was quickly extubated. Her AMS was not improved with narcan in the ED.  -Neurology consulted, unclear if this was a seizure.   -Neuro checks, fall precautions, aspiration precautions  -Will hold trazodone and wellbutrin that patient was recently started on by Rehab unit    Tobacco abuse  Greater than 3 minutes spent counseling patient on dangers of continued tobacco use. Will provide tobacco cessation  education.     VTE Risk Mitigation (From admission, onward)         Ordered     IP VTE LOW RISK PATIENT  Once      11/14/19 2050     Place sequential compression device  Until discontinued      11/14/19 2050              VTE: DEONDRE  Code: Full  Diet: regular  Dispo: pending monitoring overnight and neurology nixon Francisco PA-C  Department of Hospital Medicine   [facility]

## 2019-11-15 NOTE — CARE UPDATE
[facility]  ICU Multidisciplinary Bedside Rounds     UPDATE     Date: 11/15/2019      Plan of care reviewed with the following, Nurse, Charge Nurse, Physician, Infection Prevention and Dietician.       Needs/ Goals for the day: possible d/c home       Level of Care: Discharge

## 2019-11-15 NOTE — PLAN OF CARE
11/15/19 1126   Discharge Assessment   Assessment Type Discharge Planning Assessment   Confirmed/corrected address and phone number on facesheet? Yes   Assessment information obtained from? Patient   Expected Length of Stay (days)   (discharge)   Communicated expected length of stay with patient/caregiver yes   Prior to hospitilization cognitive status: Alert/Oriented   Prior to hospitalization functional status: Independent   Current cognitive status: Alert/Oriented   Current Functional Status: Independent   Lives With child(russ), adult   Able to Return to Prior Arrangements yes   Is patient able to care for self after discharge? Yes   Patient's perception of discharge disposition home or selfcare   Readmission Within the Last 30 Days no previous admission in last 30 days   Patient currently being followed by outpatient case management? No   Patient currently receives any other outside agency services? No   Equipment Currently Used at Home none   Do you have any problems affording any of your prescribed medications? No   Is the patient taking medications as prescribed? yes   Does the patient have transportation home? Yes   Transportation Anticipated family or friend will provide   Does the patient receive services at the [facility]? No   Discharge Plan A Home with family   DME Needed Upon Discharge  none   Patient/Family in Agreement with Plan yes   Does the patient have transportation to healthcare appointments? Yes   EDUCATION:  Things You are responsible For To Manage Your Care At Home:  1.    Getting your prescriptions filled   2.    Taking your medications as directed, DO NOT MISS ANY DOSES!  3.    Going to your follow-up doctor appointment. This is important because it  allow the doctor to monitor your progress and determine if  any changes need to made to your treatment plan.  Call [facility] Help at home number for new or repeated problems / symptoms   Call 911 for CP and / or SOB   Patient agreed to  all above    NurseGricelda notified that all CM needs are met

## 2019-11-15 NOTE — NURSING
Patient arrived on unit via stretcher accompanied by Lilliana BOO, patient AAOx4, vital signs stable no apparent distress noted.  MD Boggs notified patient arrival. Son in waiting area. Patient c/o mild pain 4/10 to arm and headache.  Notified.

## 2019-11-15 NOTE — HOSPITAL COURSE
Ms Alicea presented with headache and acute encephalopathy. Based on patient's presentation, it seems that patient suffered a seizure in the ED. Became unresponsive therefore intubated for airway protection. It seems that shortly after, while still in the ED, patient became alert despite propofol being infused. Reportedly followed commands and trying to pull ETT. Patient was therefore extubated and admitted mainly for observation post extubation. Patient complained of a headache but neuro exam normal, remained alert, awake and oriented, and labwork essentially normal except for elevated AST/ALT (of which AST resolved and ALT close to normal limits prior to discharge), lowish TSH/free T4 and positive benzodiazepine in tox screen however obtained after benzos given in the ED. CT brain negative for acute pathology. Discussed current medications with patient. She did not remember names of meds given from Rehab facility. Son brought pictures of medications. These were bupropion, multivitamins and trazodone. Per micromedex (pharamcy online resource), seizures were listed as potential side effects for both bupropion and trazodone therefore advised to stop taking. Patient also requested prescription for methadone or suboxone. I discussed that only certified providers can prescribe these. Discussed with social work who will help with scheduling outpatient follow up with a primary care physician that can facilitate this. Patient expressed not wanting to wait for neurology consultation prior to discharge. Given that patient was clinically stable and had no suicidal/homicidal ideations there was no reason to keep against her will nor have her sign AMA paperwork. Patient will need to follow up with neurology as outpatient for potential seizures. No antiepileptics indicated at this time medication induced seizures is most likely cause. Discussed with patient and patient's son. Regular diet. Activity as tolerated.

## 2019-11-15 NOTE — HPI
Radha Alicea 39 y.o. female with previous Suboxone use presents to the hospital with a chief complaint of altered mental status. The patient is unsure of the exact events that proceeded presentation to the hospital. The son reports today while at [facility] the patient complained of a headache and instructed son to call EMS. The son took the patient to the car and attempted to drive her to the hospital. On the way to the hospital the patient vomited and then went unresponsive, and had to be carried into the ED. The patient reports she entered rehab on 11/3 for Suboxone. She has not had any Suboxone since that time. Since then she has had N/V/D, headaches, and sweating that she attributed to withdrawl. She was released from [facility] in [place], a rehab facility, 4 days ago. She now lives with her children. She denies any recreational drugs since release. She reports she was given prescriptions for trazodone and wellbutrin from the Rehab with which she has been compliant. The family is confident she has not used any recreational drugs since discharge. She believes the rehab unit may have been giving her benzos to help with withdrawal, but she is unsure. She smokes 0.25ppd, and denies alcohol use. She is no longer using recreational drugs. She does have a remote history of seizures, but the patient and family are unable to describe the type.

## 2019-11-15 NOTE — ED NOTES
"Patient place on Co2 monitoring per Dr. Collado. Post extubation, patient Co2 remains between 35-39. No respiratory distress noted. Patient awake, alert, following commands. Patient states that she is recovering from suboxone. Denies any drug use today. States that her "blood pressure has been high and I've been having headaches. I may have had a seizure." Patient very tearful and repetitive that she has been drug free for 19 days.  "

## 2019-11-15 NOTE — EICU
"eICU Note : New Admit :notified by the [facility]:Temitope with possible sz activity and AMS    Brief HPI: 40 y/o F with Suboxone use admitted to the ICU with altered mental status changes possible seizure She was initially intubated in the ER and later extubated, intubated for almost 3-4 hrs and then extubated as her consciousness improved .    Vital Signs :  Vitals:    11/14/19 2032 11/14/19 2046 11/14/19 2116 11/14/19 2145   BP: 109/67 101/62  110/71   BP Location:    Left arm   Pulse: 94 90 92 89   Resp: 16 16 18 18   Temp:    98.8 °F (37.1 °C)   TempSrc:    Axillary   SpO2: 100% 100% 100% 100%   Weight:    57.3 kg (126 lb 5.2 oz)   Height:    5' 4" (1.626 m)         Camera Assessment :Pt lying in bed in no distress  Data:  WBC: 10.3, hemoglobin 14.6, hematocrit 45.5, platelets 282  Sodium 142, potassium 3.9, chloride 106, CO2 30, BUN 8, creatinine 0.8, EGFR greater than 60, glucose 121, calcium 10.1, magnesium 2.2, total protein 7.3, albumin 4.1, AST 41, , ammonia 32, proBNP 85, troponin 0 0.012, lactate 1.0, TSH 0.375, free T4 0.69  Urine drug screen: Present are positive.  CT Head:Bilateral ethmoid and left sinus disease.        Impression and recommendations:1. AMS due to possible seizure in the setting of Methamphetamine s/p Intubation and extubation.Continue to observe , Neuro to see her for seizures.  2. 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

## 2019-11-15 NOTE — NURSING
Pt AAO X4, VSS, denied any pain at present time, All discharge instructions given pt verbalized understanding, pt will be transported home shortly.

## 2019-11-15 NOTE — SUBJECTIVE & OBJECTIVE
No past medical history on file.    No past surgical history on file.    Review of patient's allergies indicates:  No Known Allergies    No current facility-administered medications on file prior to encounter.      No current outpatient medications on file prior to encounter.     Family History     Problem Relation (Age of Onset)    Diabetes Mother    Heart disease Mother        Tobacco Use    Smoking status: Not on file   Substance and Sexual Activity    Alcohol use: Not on file    Drug use: Not on file    Sexual activity: Not on file     Review of Systems   Constitutional: Negative for chills and fever.   HENT: Negative for nosebleeds and tinnitus.    Eyes: Negative for photophobia and visual disturbance.   Respiratory: Negative for shortness of breath and wheezing.    Cardiovascular: Negative for chest pain, palpitations and leg swelling.   Gastrointestinal: Positive for diarrhea, nausea and vomiting. Negative for abdominal distention.   Genitourinary: Negative for dysuria, flank pain and hematuria.   Musculoskeletal: Negative for gait problem and joint swelling.   Skin: Negative for rash and wound.   Neurological: Positive for headaches. Negative for seizures and syncope.     Objective:     Vital Signs (Most Recent):  Temp: 98.3 °F (36.8 °C) (11/14/19 1709)  Pulse: 90 (11/14/19 2046)  Resp: 16 (11/14/19 2046)  BP: 101/62 (11/14/19 2046)  SpO2: 100 % (11/14/19 2046) Vital Signs (24h Range):  Temp:  [98.3 °F (36.8 °C)] 98.3 °F (36.8 °C)  Pulse:  [] 90  Resp:  [12-58] 16  SpO2:  [99 %-100 %] 100 %  BP: ()/() 101/62     Weight: 59 kg (130 lb)  Body mass index is 22.31 kg/m².    Physical Exam   Constitutional: She appears well-developed and well-nourished. No distress.   HENT:   Head: Normocephalic and atraumatic.   Eyes: Conjunctivae and EOM are normal. Right eye exhibits no discharge. Left eye exhibits no discharge.   Neck: Normal range of motion. No thyromegaly present.   Cardiovascular:  Normal rate and regular rhythm.   No murmur heard.  Pulmonary/Chest: Effort normal and breath sounds normal. No respiratory distress.   Abdominal: Soft. Bowel sounds are normal. She exhibits no distension and no mass. There is no tenderness.   Musculoskeletal: She exhibits no edema or deformity.   Neurological: She is alert. No sensory deficit.   Tremulous on finger to nose. Oriented to person and place. Disoriented to time   Skin: Skin is warm and dry.   Psychiatric: She has a normal mood and affect. Her behavior is normal.   Nursing note and vitals reviewed.        CRANIAL NERVES     CN III, IV, VI   Extraocular motions are normal.        Significant Labs:   CBC:   Recent Labs   Lab 11/14/19  1727   WBC 10.30   HGB 14.6   HCT 45.5        CMP:   Recent Labs   Lab 11/14/19  1727      K 3.9      CO2 30*   *   BUN 8   CREATININE 0.8   CALCIUM 10.1   PROT 7.3   ALBUMIN 4.1   BILITOT 0.3   ALKPHOS 92   AST 41*   *   ANIONGAP 6*   EGFRNONAA >60     Cardiac Markers:   Recent Labs   Lab 11/14/19  1727   BNP 85     Coagulation:   Recent Labs   Lab 11/14/19  1727   INR 1.0   APTT 25.7     Lactic Acid:   Recent Labs   Lab 11/14/19  1727   LACTATE 1.6     Troponin:   Recent Labs   Lab 11/14/19  1727   TROPONINI 0.012     TSH:   Recent Labs   Lab 11/14/19  1727   TSH 0.375*     Urine Studies:   Recent Labs   Lab 11/14/19  1734   COLORU Yellow   APPEARANCEUA Clear   PHUR 7.0   SPECGRAV 1.020   PROTEINUA Negative   GLUCUA Negative   KETONESU Negative   BILIRUBINUA Negative   OCCULTUA Negative   NITRITE Negative   UROBILINOGEN Negative   LEUKOCYTESUR Trace*   WBCUA 1       Significant Imaging:   Imaging Results          X-Ray Chest AP Portable (Final result)  Result time 11/14/19 19:40:19    Final result by Raymond Espinoza MD (11/14/19 19:40:19)                 Impression:      No acute process.      Electronically signed by: Raymond Espinoza MD  Date:    11/14/2019  Time:    19:40             Narrative:     EXAMINATION:  XR CHEST AP PORTABLE    CLINICAL HISTORY:  Altered mental status, unspecified    TECHNIQUE:  Single frontal view of the chest was performed.    COMPARISON:  11/14/2019.    FINDINGS:  The support devices are no longer present.  The trachea is unremarkable.  The cardiomediastinal silhouette is within normal limits.  The hemidiaphragms are unremarkable.  There is no evidence of free air beneath the hemidiaphragms.  There are no pleural effusions.  There is no evidence of a pneumothorax.  There is no evidence of pneumomediastinum.  No airspace opacity is present.  The osseous structures are unremarkable.                               CT Head Without Contrast (Final result)  Result time 11/14/19 17:46:11    Final result by Clover Chandler MD (11/14/19 17:46:11)                 Impression:      No acute intracranial abnormality detected.    Mild bilateral ethmoid and left sphenoid sinus disease.      Electronically signed by: Clover Chandler  Date:    11/14/2019  Time:    17:46             Narrative:    EXAMINATION:  CT OF THE HEAD WITHOUT    CLINICAL HISTORY:  Confusion/delirium, altered LOC, unexplained;    TECHNIQUE:  5 mm unenhanced axial images were obtained from the skull base to the vertex.    COMPARISON:  None.    FINDINGS:  The ventricles, basal cisterns, and cortical sulci are within normal limits for patient's stated age. There is no acute intracranial hemorrhage, territorial infarct or mass effect, or midline shift. In the visualized paranasal sinuses, there is mucoperiosteal thickening involving bilateral posterior ethmoid and the left sphenoid sinus.                               X-Ray Chest AP Portable (Final result)  Result time 11/14/19 17:44:30    Final result by Raymond Espinoza MD (11/14/19 17:44:30)                 Impression:      Satisfactory position of endotracheal and enteric tube tips.    No acute intrathoracic process.      Electronically signed by: Raymond Espinoza  MD  Date:    11/14/2019  Time:    17:44             Narrative:    EXAMINATION:  XR CHEST AP PORTABLE    CLINICAL HISTORY:  intubation;    TECHNIQUE:  Single frontal view of the chest was performed.    COMPARISON:  None    FINDINGS:  The endotracheal tube tip is 4 cm from the kaye.  The enteric tube tip is in the left lower abdominal quadrant.  Monitoring EKG leads are present.    The cardiomediastinal silhouette is within normal limits.  The hilar structures are unremarkable.  The hemidiaphragms are within normal limits.  There is no evidence of free air beneath the hemidiaphragms.  There are no pleural effusions.  There is no evidence of a pneumothorax.  There is no evidence of pneumomediastinum.  No airspace opacity is present.  The osseous structures are unremarkable.

## 2019-11-15 NOTE — ASSESSMENT & PLAN NOTE
Greater than 3 minutes spent counseling patient on dangers of continued tobacco use. Will provide tobacco cessation education.

## 2019-11-15 NOTE — PROGRESS NOTES
[facility] CASE MANAGEMENT                  WRITTEN DISCHARGE INFORMATION      APPOINTMENTS AND RESOURCES TO HELP YOU MANAGE YOUR CARE AT HOME BASED ON YOUR PREFERENCES:  (If an appointment is not scheduled for you when you leave the hospital, call your doctor to schedule a follow up visit within a week)    Follow-up Information     [facility]. Schedule an appointment as soon as possible for a visit in 1 week.    Why:  for Hospital Follow up  Contact information:  [address]  [place] [address]  [phone]                   Healthy Living Instructions to HELP MANAGE YOUR CARE AT HOME:  Things You are responsible for:  1.    Getting your prescriptions filled   2.    Taking your medications as directed, DO NOT MISS ANY DOSES!  3.    Following the diet and exercise recommended by your doctor  4.    Going to your follow-up doctor appointment. This is important because it allows the doctor to monitor your progress and determine if any changes need to made to your treatment plan.  5. If you have any questions about MANAGING YOUR CARE AT HOME Call the Nurse Care Line for 24/7 Assistance [phone]       Please answer any calls you may receive from [facility]. We want to continue to support you as you manage your healthcare needs. [facility] is happy to have the opportunity to serve you.      Thank you for choosing [facility] for your healthcare needs!  Your [facility] Case Management Team,

## 2019-11-15 NOTE — PLAN OF CARE
11/15/19 1129   Post-Acute Status   Post-Acute Authorization Other   Other Status No Post-Acute Service Needs   Discharge Delays None known at this time

## 2019-11-15 NOTE — ASSESSMENT & PLAN NOTE
Unclear as to the etiology of event today. Her electrolytes are within normal limits, CT head without acute abnormality, chest x-ray without acute process, UDS positive for benzos (though family denies, and attributes to medication given by rehab), TSH of 0.375 and T4 of 0.69, no fevers or leukocytosis, and no signs of uremia. She required intubation in the ED for airway protection, but was quickly extubated. Her AMS was not improved with narcan in the ED.  -Neurology consulted, unclear if this was a seizure.   -Neuro checks, fall precautions, aspiration precautions  -Will hold trazodone and wellbutrin that patient was recently started on by Rehab unit

## 2019-11-15 NOTE — SUBJECTIVE & OBJECTIVE
No past medical history on file.    No past surgical history on file.    Review of patient's allergies indicates:  No Known Allergies    No current facility-administered medications on file prior to encounter.      No current outpatient medications on file prior to encounter.     Family History     None        Tobacco Use    Smoking status: Not on file   Substance and Sexual Activity    Alcohol use: Not on file    Drug use: Not on file    Sexual activity: Not on file     Review of Systems   Constitutional: Negative for chills and fever.   HENT: Negative for nosebleeds and tinnitus.    Eyes: Negative for photophobia and visual disturbance.   Respiratory: Negative for shortness of breath and wheezing.    Cardiovascular: Negative for chest pain, palpitations and leg swelling.   Gastrointestinal: Positive for diarrhea, nausea and vomiting. Negative for abdominal distention.   Genitourinary: Negative for dysuria, flank pain and hematuria.   Musculoskeletal: Negative for gait problem and joint swelling.   Skin: Negative for rash and wound.   Neurological: Positive for headaches. Negative for seizures and syncope.     Objective:     Vital Signs (Most Recent):  Temp: 98.3 °F (36.8 °C) (11/14/19 1709)  Pulse: 90 (11/14/19 2046)  Resp: 16 (11/14/19 2046)  BP: 101/62 (11/14/19 2046)  SpO2: 100 % (11/14/19 2046) Vital Signs (24h Range):  Temp:  [98.3 °F (36.8 °C)] 98.3 °F (36.8 °C)  Pulse:  [] 90  Resp:  [12-58] 16  SpO2:  [99 %-100 %] 100 %  BP: ()/() 101/62     Weight: 59 kg (130 lb)  Body mass index is 22.31 kg/m².    Physical Exam   Constitutional: She appears well-developed and well-nourished. No distress.   HENT:   Head: Normocephalic and atraumatic.   Right Ear: External ear normal.   Left Ear: External ear normal.   Eyes: Conjunctivae and EOM are normal. Right eye exhibits no discharge. Left eye exhibits no discharge.   Neck: Normal range of motion. No thyromegaly present.   Cardiovascular: Normal  rate and regular rhythm.   No murmur heard.  Pulmonary/Chest: Effort normal and breath sounds normal. No respiratory distress.   Abdominal: Soft. Bowel sounds are normal. She exhibits no distension and no mass. There is no tenderness.   Musculoskeletal: She exhibits no edema or deformity.   Neurological: She is alert. No sensory deficit.   Tremulous on finger to nose, Oriented to person and place disoriented to time.   Skin: Skin is warm and dry.   Psychiatric: She has a normal mood and affect. Her behavior is normal.   Nursing note and vitals reviewed.        CRANIAL NERVES     CN III, IV, VI   Extraocular motions are normal.        Significant Labs:   CBC:   Recent Labs   Lab 11/14/19  1727   WBC 10.30   HGB 14.6   HCT 45.5        CMP:   Recent Labs   Lab 11/14/19  1727      K 3.9      CO2 30*   *   BUN 8   CREATININE 0.8   CALCIUM 10.1   PROT 7.3   ALBUMIN 4.1   BILITOT 0.3   ALKPHOS 92   AST 41*   *   ANIONGAP 6*   EGFRNONAA >60     Cardiac Markers:   Recent Labs   Lab 11/14/19  1727   BNP 85     Coagulation:   Recent Labs   Lab 11/14/19  1727   INR 1.0   APTT 25.7     Lactic Acid:   Recent Labs   Lab 11/14/19  1727   LACTATE 1.6     Troponin:   Recent Labs   Lab 11/14/19  1727   TROPONINI 0.012     TSH:   Recent Labs   Lab 11/14/19  1727   TSH 0.375*     Urine Studies:   Recent Labs   Lab 11/14/19  1734   COLORU Yellow   APPEARANCEUA Clear   PHUR 7.0   SPECGRAV 1.020   PROTEINUA Negative   GLUCUA Negative   KETONESU Negative   BILIRUBINUA Negative   OCCULTUA Negative   NITRITE Negative   UROBILINOGEN Negative   LEUKOCYTESUR Trace*   WBCUA 1       Significant Imaging:   Imaging Results          X-Ray Chest AP Portable (Final result)  Result time 11/14/19 19:40:19    Final result by Raymond Espinoza MD (11/14/19 19:40:19)                 Impression:      No acute process.      Electronically signed by: Raymond Espinoza MD  Date:    11/14/2019  Time:    19:40             Narrative:     EXAMINATION:  XR CHEST AP PORTABLE    CLINICAL HISTORY:  Altered mental status, unspecified    TECHNIQUE:  Single frontal view of the chest was performed.    COMPARISON:  11/14/2019.    FINDINGS:  The support devices are no longer present.  The trachea is unremarkable.  The cardiomediastinal silhouette is within normal limits.  The hemidiaphragms are unremarkable.  There is no evidence of free air beneath the hemidiaphragms.  There are no pleural effusions.  There is no evidence of a pneumothorax.  There is no evidence of pneumomediastinum.  No airspace opacity is present.  The osseous structures are unremarkable.                               CT Head Without Contrast (Final result)  Result time 11/14/19 17:46:11    Final result by Clover Chandler MD (11/14/19 17:46:11)                 Impression:      No acute intracranial abnormality detected.    Mild bilateral ethmoid and left sphenoid sinus disease.      Electronically signed by: Clover Chandler  Date:    11/14/2019  Time:    17:46             Narrative:    EXAMINATION:  CT OF THE HEAD WITHOUT    CLINICAL HISTORY:  Confusion/delirium, altered LOC, unexplained;    TECHNIQUE:  5 mm unenhanced axial images were obtained from the skull base to the vertex.    COMPARISON:  None.    FINDINGS:  The ventricles, basal cisterns, and cortical sulci are within normal limits for patient's stated age. There is no acute intracranial hemorrhage, territorial infarct or mass effect, or midline shift. In the visualized paranasal sinuses, there is mucoperiosteal thickening involving bilateral posterior ethmoid and the left sphenoid sinus.                               X-Ray Chest AP Portable (Final result)  Result time 11/14/19 17:44:30    Final result by Raymond Espinoza MD (11/14/19 17:44:30)                 Impression:      Satisfactory position of endotracheal and enteric tube tips.    No acute intrathoracic process.      Electronically signed by: Raymond Espinoza  MD  Date:    11/14/2019  Time:    17:44             Narrative:    EXAMINATION:  XR CHEST AP PORTABLE    CLINICAL HISTORY:  intubation;    TECHNIQUE:  Single frontal view of the chest was performed.    COMPARISON:  None    FINDINGS:  The endotracheal tube tip is 4 cm from the kaye.  The enteric tube tip is in the left lower abdominal quadrant.  Monitoring EKG leads are present.    The cardiomediastinal silhouette is within normal limits.  The hilar structures are unremarkable.  The hemidiaphragms are within normal limits.  There is no evidence of free air beneath the hemidiaphragms.  There are no pleural effusions.  There is no evidence of a pneumothorax.  There is no evidence of pneumomediastinum.  No airspace opacity is present.  The osseous structures are unremarkable.

## 2019-11-15 NOTE — ED NOTES
"Patient extremely agitated, flailing legs in stretcher and attempting to remove ET tube. Patient able to follow commands appropriately and patient mouth that "suboxone," when asked if she takes any medications. Dr. Collado notified and at bedside.   "

## 2019-11-15 NOTE — SUBJECTIVE & OBJECTIVE
History reviewed. No pertinent past medical history.    Past Surgical History:   Procedure Laterality Date     SECTION N/A        Review of patient's allergies indicates:  No Known Allergies    No current facility-administered medications on file prior to encounter.      No current outpatient medications on file prior to encounter.     Family History     Problem Relation (Age of Onset)    Diabetes Mother    Heart disease Mother        Tobacco Use    Smoking status: Current Every Day Smoker     Packs/day: 0.25   Substance and Sexual Activity    Alcohol use: Never     Frequency: Never    Drug use: Not Currently    Sexual activity: Not on file     Review of Systems   Constitutional: Negative for chills and fever.   HENT: Positive for congestion. Negative for nosebleeds and tinnitus.    Eyes: Negative for photophobia and visual disturbance.   Respiratory: Negative for shortness of breath and wheezing.    Cardiovascular: Negative for chest pain, palpitations and leg swelling.   Gastrointestinal: Positive for abdominal pain, diarrhea, nausea and vomiting. Negative for abdominal distention.   Genitourinary: Negative for dysuria, flank pain and hematuria.   Musculoskeletal: Negative for gait problem and joint swelling.   Skin: Negative for rash and wound.   Neurological: Negative for seizures and syncope.     Objective:     Vital Signs (Most Recent):  Temp: 98.3 °F (36.8 °C) (19 1709)  Pulse: 90 (19)  Resp: 16 (19)  BP: 101/62 (19)  SpO2: 100 % (19) Vital Signs (24h Range):  Temp:  [98.3 °F (36.8 °C)] 98.3 °F (36.8 °C)  Pulse:  [] 90  Resp:  [12-58] 16  SpO2:  [99 %-100 %] 100 %  BP: ()/() 101/62     Weight: 59 kg (130 lb)  Body mass index is 22.31 kg/m².    Physical Exam   Constitutional: She appears well-developed and well-nourished. No distress.   HENT:   Head: Normocephalic and atraumatic.   Eyes: Conjunctivae and EOM are normal. Right eye  exhibits no discharge. Left eye exhibits no discharge.   Neck: Normal range of motion. No thyromegaly present.   Cardiovascular: Normal rate and regular rhythm.   No murmur heard.  Pulmonary/Chest: Effort normal and breath sounds normal. No respiratory distress.   Abdominal: Soft. Bowel sounds are normal. She exhibits no distension and no mass. There is no tenderness.   Musculoskeletal: She exhibits no edema or deformity.   Neurological: She is alert. No sensory deficit.   Tremulous on finger to nose. Oriented to person and place. Disoriented to time   Skin: Skin is warm and dry.   Psychiatric: She has a normal mood and affect. Her behavior is normal.   Nursing note and vitals reviewed.        CRANIAL NERVES     CN III, IV, VI   Extraocular motions are normal.        Significant Labs:   CBC:   Recent Labs   Lab 11/14/19 1727   WBC 10.30   HGB 14.6   HCT 45.5        CMP:   Recent Labs   Lab 11/14/19 1727      K 3.9      CO2 30*   *   BUN 8   CREATININE 0.8   CALCIUM 10.1   PROT 7.3   ALBUMIN 4.1   BILITOT 0.3   ALKPHOS 92   AST 41*   *   ANIONGAP 6*   EGFRNONAA >60     Cardiac Markers:   Recent Labs   Lab 11/14/19  1727   BNP 85     Coagulation:   Recent Labs   Lab 11/14/19 1727   INR 1.0   APTT 25.7     Lactic Acid:   Recent Labs   Lab 11/14/19 1727 11/14/19  2018   LACTATE 1.6 1.0     Troponin:   Recent Labs   Lab 11/14/19 1727   TROPONINI 0.012     TSH:   Recent Labs   Lab 11/14/19 1727   TSH 0.375*     Urine Studies:   Recent Labs   Lab 11/14/19  1734   COLORU Yellow   APPEARANCEUA Clear   PHUR 7.0   SPECGRAV 1.020   PROTEINUA Negative   GLUCUA Negative   KETONESU Negative   BILIRUBINUA Negative   OCCULTUA Negative   NITRITE Negative   UROBILINOGEN Negative   LEUKOCYTESUR Trace*   WBCUA 1       Significant Imaging:   Imaging Results          X-Ray Chest AP Portable (Final result)  Result time 11/14/19 19:40:19    Final result by Raymond Espinoza MD (11/14/19 19:40:19)                  Impression:      No acute process.      Electronically signed by: Raymond Espinoza MD  Date:    11/14/2019  Time:    19:40             Narrative:    EXAMINATION:  XR CHEST AP PORTABLE    CLINICAL HISTORY:  Altered mental status, unspecified    TECHNIQUE:  Single frontal view of the chest was performed.    COMPARISON:  11/14/2019.    FINDINGS:  The support devices are no longer present.  The trachea is unremarkable.  The cardiomediastinal silhouette is within normal limits.  The hemidiaphragms are unremarkable.  There is no evidence of free air beneath the hemidiaphragms.  There are no pleural effusions.  There is no evidence of a pneumothorax.  There is no evidence of pneumomediastinum.  No airspace opacity is present.  The osseous structures are unremarkable.                               CT Head Without Contrast (Final result)  Result time 11/14/19 17:46:11    Final result by Clover Chandler MD (11/14/19 17:46:11)                 Impression:      No acute intracranial abnormality detected.    Mild bilateral ethmoid and left sphenoid sinus disease.      Electronically signed by: Clover Chandler  Date:    11/14/2019  Time:    17:46             Narrative:    EXAMINATION:  CT OF THE HEAD WITHOUT    CLINICAL HISTORY:  Confusion/delirium, altered LOC, unexplained;    TECHNIQUE:  5 mm unenhanced axial images were obtained from the skull base to the vertex.    COMPARISON:  None.    FINDINGS:  The ventricles, basal cisterns, and cortical sulci are within normal limits for patient's stated age. There is no acute intracranial hemorrhage, territorial infarct or mass effect, or midline shift. In the visualized paranasal sinuses, there is mucoperiosteal thickening involving bilateral posterior ethmoid and the left sphenoid sinus.                               X-Ray Chest AP Portable (Final result)  Result time 11/14/19 17:44:30    Final result by Raymond Espinoza MD (11/14/19 17:44:30)                 Impression:       Satisfactory position of endotracheal and enteric tube tips.    No acute intrathoracic process.      Electronically signed by: Raymond Espinoza MD  Date:    11/14/2019  Time:    17:44             Narrative:    EXAMINATION:  XR CHEST AP PORTABLE    CLINICAL HISTORY:  intubation;    TECHNIQUE:  Single frontal view of the chest was performed.    COMPARISON:  None    FINDINGS:  The endotracheal tube tip is 4 cm from the kaye.  The enteric tube tip is in the left lower abdominal quadrant.  Monitoring EKG leads are present.    The cardiomediastinal silhouette is within normal limits.  The hilar structures are unremarkable.  The hemidiaphragms are within normal limits.  There is no evidence of free air beneath the hemidiaphragms.  There are no pleural effusions.  There is no evidence of a pneumothorax.  There is no evidence of pneumomediastinum.  No airspace opacity is present.  The osseous structures are unremarkable.

## 2019-11-15 NOTE — PLAN OF CARE
Problem: Adult Inpatient Plan of Care  Goal: Plan of Care Review  Outcome: Ongoing, Progressing   Patient admitted on 11/14/19 for AMS due to possible seizure in the setting of Methamphetamine s/p Intubation and extubation for airway protection. Per report, patient disoriented x 3. Once arrived in ICU patient more oriented x 3, c/o headache 9/10 tylenol ordered and neuro consult for 11/15/19. Seizure and fall precaution maintain for safety. No difficulty eating/drinking/swallowing, urine output adequate.

## 2019-11-15 NOTE — ED NOTES
Patient becoming rapidly unresponsive. No longer responding to painful stimuli. Preparing for intubation per Dr. Pennington, at bedside.

## 2019-11-15 NOTE — CARE UPDATE
[facility]  ICU Multidisciplinary Bedside Rounds   SUMMARY     Date: 11/15/2019    Prehospitalization: Home  Admit Date / LOS : 11/14/2019/ 0 days    Diagnosis: Altered mental state    Consults:        Active: Neuro       Needed: N/A     Code Status: Full Code   Advanced Directive: <no information>    LDA: PIV       Central Lines/Site/Justification:Patient Does Not Have Central Line       Urinary Cath/Order/Justification:Critically Ill in ICU    Vasopressors/Infusions:        GOALS: Volume/ Hemodynamic: N/A                     RASS:     CAM ICU: N/A  Pain Management: PO       Pain Controlled: yes     Rhythm: NSR    Respiratory Device: Room Air    Oxygen Concentration (%):  [30] 30  Resp Rate Total:  [12 br/min] 12 br/min  Vt Set:  [400 mL] 400 mL  PEEP/CPAP:  [5 cmH20] 5 cmH20  Mean Airway Pressure:  [7.3 cmH20] 7.3 cmH20             Most Recent SBT/ SAT: N/A       MOVE Screen:     VTE Prophylaxis: Ambulation  Mobility: Ambulatory and S: Self  Stress Ulcer Prophylaxis: No    Dietary: PO  Tolerance: yes  /  Advancement: yes    Isolation: No active isolations    Restraints: No    Significant Dates:  Post Op Date: N/A  Rescue Date: N/A  Imaging/ Diagnostics: N/A    Noteworthy Labs:      CBC/Anemia Labs: Coags:    Recent Labs   Lab 11/14/19  1727 11/15/19  0308   WBC 10.30 9.25   HGB 14.6 14.0   HCT 45.5 43.2    227   MCV 91 90   RDW 13.4 13.5    Recent Labs   Lab 11/14/19  1727   INR 1.0   APTT 25.7        Chemistries:   Recent Labs   Lab 11/14/19 1727 11/15/19  0308    143   K 3.9 3.7    108   CO2 30* 29   BUN 8 10   CREATININE 0.8 0.7   CALCIUM 10.1 9.2   PROT 7.3 6.1   BILITOT 0.3 0.2   ALKPHOS 92 82   * 84*   AST 41* 31   MG 2.2  --         Cardiac Enzymes: Ejection Fractions:    Recent Labs     11/14/19 1727   TROPONINI 0.012    No results found for: EF     POCT Glucose: HbA1c:    Recent Labs   Lab 11/14/19  1718   POCTGLUCOSE 114*    No results found for: HGBA1C      Needs from Care Team: none     ICU LOS 8h  Level of Care: OK to Transfer

## 2019-11-16 PROBLEM — R51.9 HEADACHE: Status: ACTIVE | Noted: 2019-11-16

## 2019-11-16 PROBLEM — F17.200 TOBACCO USE DISORDER, SEVERE, DEPENDENCE: Status: ACTIVE | Noted: 2019-11-14

## 2019-11-16 PROBLEM — F19.10 POLYSUBSTANCE ABUSE: Status: ACTIVE | Noted: 2019-11-16

## 2019-11-16 PROBLEM — R40.20 LOSS OF CONSCIOUSNESS: Status: ACTIVE | Noted: 2019-11-16

## 2019-11-16 NOTE — DISCHARGE SUMMARY
[facility] Hospital Medicine  Discharge Summary      Patient Name: Radha Alicea  MRN: [id number]  Admission Date: 11/14/2019  Hospital Length of Stay: 0 days  Discharge Date and Time:  11/15/2019 6:51 AM  Attending Physician: Aubree att. providers found   Discharging Provider: Ekaterina Hancock MD  Primary Care Provider: Primary Doctor Aubree      HPI:   Radha Alicea 39 y.o. female with previous Suboxone use presents to the hospital with a chief complaint of altered mental status. The patient is unsure of the exact events that proceeded presentation to the hospital. The son reports today while at [facility] the patient complained of a headache and instructed son to call EMS. The son took the patient to the car and attempted to drive her to the hospital. On the way to the hospital the patient vomited and then went unresponsive, and had to be carried into the ED. The patient reports she entered rehab on 11/3 for Suboxone. She has not had any Suboxone since that time. Since then she has had N/V/D, headaches, and sweating that she attributed to withdrawl. She was released from [facility] in [place], a rehab facility, 4 days ago. She now lives with her children. She denies any recreational drugs since release. She reports she was given prescriptions for trazodone and wellbutrin from the Rehab with which she has been compliant. The family is confident she has not used any recreational drugs since discharge. She believes the rehab unit may have been giving her benzos to help with withdrawal, but she is unsure. She smokes 0.25ppd, and denies alcohol use. She is no longer using recreational drugs. She does have a remote history of seizures, but the patient and family are unable to describe the type.    * No surgery found *      Hospital Course:   Ms Alicea presented with headache and acute encephalopathy. Based on patient's presentation, it seems that patient suffered a seizure in the ED. Became  unresponsive therefore intubated for airway protection. It seems that shortly after, while still in the ED, patient became alert despite propofol being infused. Reportedly followed commands and trying to pull ETT. Patient was therefore extubated and admitted mainly for observation post extubation. Patient complained of a headache but neuro exam normal, remained alert, awake and oriented, and labwork essentially normal except for elevated AST/ALT (of which AST resolved and ALT close to normal limits prior to discharge), lowish TSH/free T4 and positive benzodiazepine in tox screen however obtained after benzos given in the ED. CT brain negative for acute pathology. Discussed current medications with patient. She did not remember names of meds given from Rehab facility. Son brought pictures of medications. These were bupropion, multivitamins and trazodone. Per [employer] (pharamcy online resource), seizures were listed as potential side effects for both bupropion and trazodone therefore advised to stop taking. Patient also requested prescription for methadone or suboxone. I discussed that only certified providers can prescribe these. Discussed with social work who will help with scheduling outpatient follow up with a primary care physician that can facilitate this. Patient expressed not wanting to wait for neurology consultation prior to discharge. Given that patient was clinically stable, independent, ambulatory and had no suicidal/homicidal ideations there was no reason to keep against her will nor have her sign AMA paperwork. Patient will need to follow up with neurology as outpatient for potential seizures. No antiepileptics indicated at this time medication induced seizures is most likely cause. Discussed with patient, patient's daughter (over the phone) and patient's son (in person). Regular diet. Activity as tolerated.      Consults: neurology    Final Active Diagnoses:    Diagnosis Date Noted POA    PRINCIPAL  PROBLEM:  Altered mental state [R41.82] 11/14/2019 Yes    Polysubstance abuse [F19.10] 11/16/2019 Yes    Headache [R51] 11/16/2019 Yes    Loss of consciousness [R40.20] 11/16/2019 Yes    Tobacco use disorder, severe, dependence [F17.200] 11/14/2019 Yes      Problems Resolved During this Admission:       Discharged Condition: good    Disposition: Home or Self Care    Follow Up:  Follow-up Information     [facility] On 11/20/2019.    Why:  @8:15am for Hospital Follow up  Contact information:  [address]  [place] [address]  [phone]                 Medications:  Reconciled Home Medications:      Medication List      CONTINUE taking these medications    multivitamin per tablet  Commonly known as:  THERAGRAN  Take 1 tablet by mouth once daily.        STOP taking these medications    buPROPion 150 MG TB24 tablet  Commonly known as:  WELLBUTRIN XL     traZODone 100 MG tablet  Commonly known as:  DESYREL            Indwelling Lines/Drains at time of discharge:   Lines/Drains/Airways     None                 Time spent on the discharge of patient: > 45 minutes  Patient was seen and examined on the date of discharge and determined to be suitable for discharge.    Critical care time spent on the evaluation and treatment of severe organ dysfunction, review of pertinent labs and imaging studies, discussions with consulting providers and discussions with patient/family: > 45 minutes.     Ekaterina Hancock MD  Department of Hospital Medicine  [facility]

## 2022-03-28 ENCOUNTER — HISTORICAL (OUTPATIENT)
Dept: ADMINISTRATIVE | Facility: HOSPITAL | Age: 42
End: 2022-03-28

## 2022-11-29 ENCOUNTER — TELEPHONE (OUTPATIENT)
Dept: PSYCHIATRY | Facility: CLINIC | Age: 42
End: 2022-11-29
Payer: MEDICAID

## 2022-11-29 NOTE — TELEPHONE ENCOUNTER
----- Message from Elizabeth Bell sent at 11/29/2022 10:56 AM CST -----  Contact: Rosario De Leonnifer is requesting a callback from a  to establish care and medication management.  Please call Radha at [phone].    Thanks